# Patient Record
(demographics unavailable — no encounter records)

---

## 2025-02-14 NOTE — PROCEDURE
[de-identified] : Dr. Ruffin [de-identified] : Reason for nasal endoscopy: anterior rhinoscopy insufficient to account for symptoms.   Flexible scope #2 was used. Right nasal passage with hypertrophic inferior, middle and superior turbinates. Nasal passage patent with clear middle meatus and sphenoethmoid recess. Left DNS. Left nasal passage with hypertophic inferior, middle and superior turbinates. Nasal passage was patent with clear middle meatus and sphenoethmoid recess. No mucopurulence or polyps appreciated. Nasopharynx clear. Enlarged adenoid pad

## 2025-02-14 NOTE — HISTORY OF PRESENT ILLNESS
[de-identified] : 27y/o male w/ hx of septoplasty for DNS a little over 2 years ago who is coming in for continuing to have breathing issues through the nose L>R. He feels his nose feel blocked. He states he will have to breathe through his mouth. 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 He feels his nose always feels congested but does not get any mucus out. He was seen by an allergist. He has allergies. He has tried multiple sprays. He has used Flonase and azelastine. He can't remember the names of the other sprays. They helped with his allergies but not with the breathing.

## 2025-02-14 NOTE — PROCEDURE
[de-identified] : Dr. Ruffin [de-identified] : Reason for nasal endoscopy: anterior rhinoscopy insufficient to account for symptoms.   Flexible scope #2 was used. Right nasal passage with hypertrophic inferior, middle and superior turbinates. Nasal passage patent with clear middle meatus and sphenoethmoid recess. Left DNS. Left nasal passage with hypertophic inferior, middle and superior turbinates. Nasal passage was patent with clear middle meatus and sphenoethmoid recess. No mucopurulence or polyps appreciated. Nasopharynx clear. Enlarged adenoid pad

## 2025-02-14 NOTE — HISTORY OF PRESENT ILLNESS
[de-identified] : 25y/o male w/ hx of septoplasty for DNS a little over 2 years ago who is coming in for continuing to have breathing issues through the nose L>R. He feels his nose feel blocked. He states he will have to breathe through his mouth. 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 He feels his nose always feels congested but does not get any mucus out. He was seen by an allergist. He has allergies. He has tried multiple sprays. He has used Flonase and azelastine. He can't remember the names of the other sprays. They helped with his allergies but not with the breathing.

## 2025-02-14 NOTE — ASSESSMENT
[FreeTextEntry1] : 27y/o male  w/ hx of septoplasty for DNS a little over 2 years ago who is coming in for continuing to have breathing issues through the nose L>R  Nasal Endoscopy shows Left DNS, hypertrophic turbinate's bilateral and enlarged adenoid pad  -Recommend nasal saline rinses 1-2x/day -Add Rx: mometasone and azelastine into rinses  -Recommend mute nasal to help with any nasal valve issues and while working out -Recommend f/u with Dr. 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

## 2025-02-14 NOTE — CONSULT LETTER
[Dear  ___] : Dear  [unfilled], [Consult Letter:] : I had the pleasure of evaluating your patient, [unfilled]. [Please see my note below.] : Please see my note below. [Consult Closing:] : Thank you very much for allowing me to participate in the care of this patient.  If you have any questions, please do not hesitate to contact me. [Sincerely,] : Sincerely, [FreeTextEntry3] : Taylor Ruffin MD Otolaryngology and Cranial Base Surgery  Attending Physician- Department of Otolaryngology and Head & Neck Surgery  [facility] -[employer] at [facility] Office: [phone]  Fax: [phone]

## 2025-02-14 NOTE — ASSESSMENT
[FreeTextEntry1] : 25y/o male  w/ hx of septoplasty for DNS a little over 2 years ago who is coming in for continuing to have breathing issues through the nose L>R  Nasal Endoscopy shows Left DNS, hypertrophic turbinate's bilateral and enlarged adenoid pad  -Recommend nasal saline rinses 1-2x/day -Add Rx: mometasone and azelastine into rinses  -Recommend mute nasal to help with any nasal valve issues and while working out -Recommend f/u with Dr. 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

## 2025-02-14 NOTE — END OF VISIT
[FreeTextEntry3] : I personally saw and examined Mr. MALLORY MALAVE in detail this visit today. I personally reviewed the HPI, PMH, FH, SH, ROS and medications/allergies. I have spoken to TESFAYE Brooks regarding the history and have personally determined the assessment and plan of care, and documented this myself. I was present and participated in all key portions of the encounter and all procedures noted above. I have made changes in the body of the note where appropriate.   Attesting Faculty: See Attending Signature Below

## 2025-02-14 NOTE — CONSULT LETTER
[Dear  ___] : Dear  [unfilled], [Consult Letter:] : I had the pleasure of evaluating your patient, [unfilled]. [Please see my note below.] : Please see my note below. [Consult Closing:] : Thank you very much for allowing me to participate in the care of this patient.  If you have any questions, please do not hesitate to contact me. [Sincerely,] : Sincerely, [FreeTextEntry3] : Taylor Ruffin MD Otolaryngology and Cranial Base Surgery  Attending Physician- Department of Otolaryngology and Head & Neck Surgery  [facility] -[employer] at [facility] Office: [phone]  Fax: [phone]

## 2025-02-14 NOTE — REASON FOR VISIT
[Initial Evaluation] : an initial evaluation for [FreeTextEntry2] : pt did nose surgery two years ago for breathing issues, it not help, Pt also do MMA fighting and thinks that did not help after the surgery. he feels nostril swollen and breath through  his mouth.